# Patient Record
Sex: FEMALE | Race: OTHER | HISPANIC OR LATINO
[De-identification: names, ages, dates, MRNs, and addresses within clinical notes are randomized per-mention and may not be internally consistent; named-entity substitution may affect disease eponyms.]

---

## 2018-03-06 PROBLEM — Z00.00 ENCOUNTER FOR PREVENTIVE HEALTH EXAMINATION: Status: ACTIVE | Noted: 2018-03-06

## 2018-03-08 ENCOUNTER — APPOINTMENT (OUTPATIENT)
Dept: NEUROSURGERY | Facility: CLINIC | Age: 48
End: 2018-03-08
Payer: MEDICAID

## 2018-03-08 VITALS
SYSTOLIC BLOOD PRESSURE: 121 MMHG | DIASTOLIC BLOOD PRESSURE: 75 MMHG | BODY MASS INDEX: 29.88 KG/M2 | HEART RATE: 65 BPM | WEIGHT: 175 LBS | OXYGEN SATURATION: 99 % | HEIGHT: 64 IN

## 2018-03-08 DIAGNOSIS — Z87.09 PERSONAL HISTORY OF OTHER DISEASES OF THE RESPIRATORY SYSTEM: ICD-10-CM

## 2018-03-08 DIAGNOSIS — G43.909 MIGRAINE, UNSPECIFIED, NOT INTRACTABLE, W/OUT STATUS MIGRAINOSUS: ICD-10-CM

## 2018-03-08 DIAGNOSIS — B20 HUMAN IMMUNODEFICIENCY VIRUS [HIV] DISEASE: ICD-10-CM

## 2018-03-08 DIAGNOSIS — Z86.59 PERSONAL HISTORY OF OTHER MENTAL AND BEHAVIORAL DISORDERS: ICD-10-CM

## 2018-03-08 DIAGNOSIS — F20.9 SCHIZOPHRENIA, UNSPECIFIED: ICD-10-CM

## 2018-03-08 DIAGNOSIS — Z86.73 PERSONAL HISTORY OF TRANSIENT ISCHEMIC ATTACK (TIA), AND CEREBRAL INFARCTION W/OUT RESIDUAL DEFICITS: ICD-10-CM

## 2018-03-08 DIAGNOSIS — Q28.2 ARTERIOVENOUS MALFORMATION OF CEREBRAL VESSELS: ICD-10-CM

## 2018-03-08 DIAGNOSIS — Z56.0 UNEMPLOYMENT, UNSPECIFIED: ICD-10-CM

## 2018-03-08 DIAGNOSIS — Z78.9 OTHER SPECIFIED HEALTH STATUS: ICD-10-CM

## 2018-03-08 DIAGNOSIS — F31.9 BIPOLAR DISORDER, UNSPECIFIED: ICD-10-CM

## 2018-03-08 DIAGNOSIS — F19.90 OTHER PSYCHOACTIVE SUBSTANCE USE, UNSPECIFIED, UNCOMPLICATED: ICD-10-CM

## 2018-03-08 DIAGNOSIS — Z87.891 PERSONAL HISTORY OF NICOTINE DEPENDENCE: ICD-10-CM

## 2018-03-08 PROCEDURE — 99205 OFFICE O/P NEW HI 60 MIN: CPT

## 2018-03-08 SDOH — ECONOMIC STABILITY - INCOME SECURITY: UNEMPLOYMENT, UNSPECIFIED: Z56.0

## 2018-03-09 PROBLEM — Z87.09 HISTORY OF ASTHMA: Status: RESOLVED | Noted: 2018-03-08 | Resolved: 2018-03-09

## 2018-03-16 PROBLEM — Z86.59 HISTORY OF DEPRESSION: Status: RESOLVED | Noted: 2018-03-08 | Resolved: 2018-03-16

## 2018-03-16 RX ORDER — LURASIDONE HYDROCHLORIDE 40 MG/1
40 TABLET, FILM COATED ORAL
Refills: 0 | Status: ACTIVE | COMMUNITY

## 2018-03-16 RX ORDER — ASPIRIN ENTERIC COATED TABLETS 81 MG 81 MG/1
81 TABLET, DELAYED RELEASE ORAL DAILY
Refills: 0 | Status: ACTIVE | COMMUNITY

## 2018-03-30 ENCOUNTER — OUTPATIENT (OUTPATIENT)
Dept: OUTPATIENT SERVICES | Facility: HOSPITAL | Age: 48
LOS: 1 days | Discharge: NURSING, REHAB OR LONG-TERM CARE | End: 2018-03-30
Payer: MEDICAID

## 2018-03-30 LAB — HCG SERPL-ACNC: <.1 MIU/ML — SIGNIFICANT CHANGE UP

## 2018-03-30 PROCEDURE — 84702 CHORIONIC GONADOTROPIN TEST: CPT

## 2018-03-30 PROCEDURE — C1887: CPT

## 2018-03-30 PROCEDURE — 36226 PLACE CATH VERTEBRAL ART: CPT | Mod: LT

## 2018-03-30 PROCEDURE — C1769: CPT

## 2018-03-30 PROCEDURE — 36224 PLACE CATH CAROTD ART: CPT | Mod: 50

## 2018-03-30 PROCEDURE — C1894: CPT

## 2018-03-30 PROCEDURE — 36227 PLACE CATH XTRNL CAROTID: CPT | Mod: LT

## 2018-03-30 PROCEDURE — 36223 PLACE CATH CAROTID/INOM ART: CPT | Mod: 59,RT

## 2018-03-30 PROCEDURE — 36415 COLL VENOUS BLD VENIPUNCTURE: CPT

## 2018-03-30 PROCEDURE — 36224 PLACE CATH CAROTD ART: CPT | Mod: LT

## 2018-03-30 RX ORDER — SODIUM CHLORIDE 9 MG/ML
1000 INJECTION INTRAMUSCULAR; INTRAVENOUS; SUBCUTANEOUS
Qty: 0 | Refills: 0 | Status: DISCONTINUED | OUTPATIENT
Start: 2018-03-30 | End: 2018-03-30

## 2018-03-30 RX ORDER — ACETAMINOPHEN 500 MG
1000 TABLET ORAL ONCE
Qty: 0 | Refills: 0 | Status: DISCONTINUED | OUTPATIENT
Start: 2018-03-30 | End: 2018-03-30

## 2018-03-30 RX ORDER — CHLORHEXIDINE GLUCONATE 213 G/1000ML
1 SOLUTION TOPICAL ONCE
Qty: 0 | Refills: 0 | Status: DISCONTINUED | OUTPATIENT
Start: 2018-03-30 | End: 2018-03-30

## 2018-03-30 RX ADMIN — SODIUM CHLORIDE 100 MILLILITER(S): 9 INJECTION INTRAMUSCULAR; INTRAVENOUS; SUBCUTANEOUS at 13:32

## 2018-03-30 NOTE — BRIEF OPERATIVE NOTE - OPERATION/FINDINGS
Diagnostic cerebral angiogram performed via right CFA using a 4 Fr sheath with manual compression at the end of the case without any evidence of further bleeding or hematoma. Patient remained neurologically intact post procedure with some nausea and wretching. Findings consistent with a left frontal AVM. Full report to follow.

## 2018-03-30 NOTE — BRIEF OPERATIVE NOTE - PROCEDURE
<<-----Click on this checkbox to enter Procedure Cerebral angiography  03/30/2018    [facility]

## 2018-04-17 ENCOUNTER — APPOINTMENT (OUTPATIENT)
Dept: NEUROLOGY | Facility: CLINIC | Age: 48
End: 2018-04-17
Payer: MEDICAID

## 2018-04-17 PROCEDURE — 95819 EEG AWAKE AND ASLEEP: CPT

## 2018-04-18 PROCEDURE — 95953: CPT

## 2018-04-19 PROCEDURE — 95953: CPT

## 2018-04-20 ENCOUNTER — APPOINTMENT (OUTPATIENT)
Dept: NEUROLOGY | Facility: CLINIC | Age: 48
End: 2018-04-20

## 2018-04-20 PROCEDURE — 95953: CPT

## 2018-05-10 ENCOUNTER — CHART COPY (OUTPATIENT)
Age: 48
End: 2018-05-10

## 2018-07-22 PROBLEM — Z78.9 ALCOHOL USE: Status: ACTIVE | Noted: 2018-03-08

## 2018-07-22 PROBLEM — Z86.73 HISTORY OF STROKE: Status: RESOLVED | Noted: 2018-03-08 | Resolved: 2018-07-22

## 2018-07-27 ENCOUNTER — APPOINTMENT (OUTPATIENT)
Dept: NEUROLOGY | Facility: CLINIC | Age: 48
End: 2018-07-27
Payer: MEDICAID

## 2018-07-27 VITALS
BODY MASS INDEX: 28.17 KG/M2 | HEART RATE: 64 BPM | TEMPERATURE: 97.9 F | HEIGHT: 64 IN | SYSTOLIC BLOOD PRESSURE: 136 MMHG | WEIGHT: 165 LBS | OXYGEN SATURATION: 97 % | DIASTOLIC BLOOD PRESSURE: 91 MMHG

## 2018-07-27 DIAGNOSIS — G43.709 CHRONIC MIGRAINE W/OUT AURA, NOT INTRACTABLE, W/OUT STATUS MIGRAINOSUS: ICD-10-CM

## 2018-07-27 PROCEDURE — 99205 OFFICE O/P NEW HI 60 MIN: CPT

## 2018-07-27 RX ORDER — ELVITEGRAVIR, COBICISTAT, EMTRICITABINE, AND TENOFOVIR ALAFENAMIDE 150; 150; 200; 10 MG/1; MG/1; MG/1; MG/1
150-150-200-10 TABLET ORAL DAILY
Qty: 30 | Refills: 1 | Status: ACTIVE | COMMUNITY
Start: 2018-07-27

## 2018-09-19 ENCOUNTER — OTHER (OUTPATIENT)
Age: 48
End: 2018-09-19

## 2018-10-05 ENCOUNTER — APPOINTMENT (OUTPATIENT)
Dept: NEUROLOGY | Facility: CLINIC | Age: 48
End: 2018-10-05

## 2018-10-17 ENCOUNTER — APPOINTMENT (OUTPATIENT)
Dept: NEUROLOGY | Facility: CLINIC | Age: 48
End: 2018-10-17
Payer: MEDICAID

## 2018-10-17 VITALS
SYSTOLIC BLOOD PRESSURE: 125 MMHG | HEART RATE: 74 BPM | DIASTOLIC BLOOD PRESSURE: 79 MMHG | BODY MASS INDEX: 28.17 KG/M2 | WEIGHT: 165 LBS | OXYGEN SATURATION: 98 % | HEIGHT: 64 IN

## 2018-10-17 DIAGNOSIS — R25.1 TREMOR, UNSPECIFIED: ICD-10-CM

## 2018-10-17 PROCEDURE — 95911 NRV CNDJ TEST 9-10 STUDIES: CPT

## 2018-10-17 PROCEDURE — 95886 MUSC TEST DONE W/N TEST COMP: CPT | Mod: 59

## 2018-10-17 RX ORDER — LANSOPRAZOLE 30 MG/1
30 CAPSULE, DELAYED RELEASE ORAL DAILY
Refills: 0 | Status: DISCONTINUED | COMMUNITY
End: 2018-10-17

## 2018-10-22 PROBLEM — R25.1 OCCASIONAL TREMORS: Status: ACTIVE | Noted: 2018-04-06

## 2019-04-02 ENCOUNTER — APPOINTMENT (OUTPATIENT)
Dept: NEUROLOGY | Facility: CLINIC | Age: 49
End: 2019-04-02
Payer: MEDICAID

## 2019-04-02 VITALS
DIASTOLIC BLOOD PRESSURE: 86 MMHG | HEART RATE: 70 BPM | WEIGHT: 180 LBS | TEMPERATURE: 98.4 F | BODY MASS INDEX: 33.13 KG/M2 | HEIGHT: 62 IN | SYSTOLIC BLOOD PRESSURE: 153 MMHG | OXYGEN SATURATION: 99 %

## 2019-04-02 DIAGNOSIS — R06.89 OTHER ABNORMALITIES OF BREATHING: ICD-10-CM

## 2019-04-02 DIAGNOSIS — G83.20 MONOPLEGIA OF UPPER LIMB AFFECTING UNSPECIFIED SIDE: ICD-10-CM

## 2019-04-02 DIAGNOSIS — M54.12 RADICULOPATHY, CERVICAL REGION: ICD-10-CM

## 2019-04-02 PROCEDURE — 99215 OFFICE O/P EST HI 40 MIN: CPT

## 2019-04-02 RX ORDER — GABAPENTIN 300 MG/1
300 CAPSULE ORAL 3 TIMES DAILY
Refills: 0 | Status: DISCONTINUED | COMMUNITY
End: 2019-04-02

## 2019-04-02 RX ORDER — LITHIUM CARBONATE 300 MG/1
300 TABLET, FILM COATED, EXTENDED RELEASE ORAL
Refills: 0 | Status: DISCONTINUED | COMMUNITY
End: 2019-04-02

## 2019-04-02 RX ORDER — NAPROXEN 500 MG/1
500 TABLET ORAL
Refills: 0 | Status: DISCONTINUED | COMMUNITY

## 2019-04-02 RX ORDER — PRAZOSIN HYDROCHLORIDE 1 MG/1
1 CAPSULE ORAL
Refills: 0 | Status: DISCONTINUED | COMMUNITY
End: 2019-04-02

## 2019-04-08 NOTE — REVIEW OF SYSTEMS
[Negative] : Heme/Lymph [Shortness Of Breath] : shortness of breath [Abdominal Pain] : abdominal pain [Vomiting] : vomiting [As Noted in HPI] : as noted in HPI

## 2019-04-15 NOTE — HISTORY OF PRESENT ILLNESS
[FreeTextEntry1] : Egyptian speaking former smoker with a history of stroke in 2006 in [place] with left hand residual, migraines, HIV+ (diagnosed in 2014), mouth chlamydia, schizophrenia, bipolar disorder, and depression who initially presented to the office for referral after "they found something in my brain". She was diagnosed with AVM of the brain after an MRI was obtained as part of work up for left hand tremors. She denied history of seizures and headaches not associated with her migraine at that time. \par \par MRI brain from 2/27/18: evidence of AVM nidus in the LEFT posterior frontal lobe with dilated venous flow voids.\par \par s/p cerebral angiogram on 3/30/18 with the  diagnosis of a 4.5 cm LEFT posterior frontal AVM with cortical and deep venous drainage.\par \par Case was presented on Cerebrovascular Conference with the plan for a 72 ambulatory EEG (referred to ) to rule out left hand tremors as seizures vs neuroleptics induced, or of schizophrenia is due to seizure activities.  Possible treatment plans post 72 hr EEG include radiosurgery vs embolization vs conservative treatment.\par \par 72hr EEG from 4/17/18: No epileptiform discharges or findings consistent with epileptiform activity. \par \par EMG 10/17/2018: 1) Reduced left sided median and ulnar amplitudes along with chronic neurogenic changes at the left APB and FDI. These findings are most consistent with a C8-T1 territory, chronic radiculopathy. A process affecting the lower brachial plexus can also be considered, although less likely given sensory sparing, and chronic changes seen only in the distal muscles of the hand. 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 Amplitude drop at the elbow may also reflect focal compression or be technical in nature.\par 3). Incomplete activation at multiple muscles of the left arm is nonspecific, can be seen in the setting of limited effort or secondary to a central process. \par \par She still complains of tremors and numbness from left shoulder to the tip of her fingers.\par \par \par Handedness: RIGHT \par \par Patient Address:\par [address] [address]\par \par Referring/Neurologist\par Dr. Chris Bethea\par [address] [facility], [address]\[place] [address]\par [phone]\par \par PCP/ Infectious Disease:\par Dr. Laquita Carbajal\par [address]\[place] [address] \par \par Neurologist:\par Dr.Salman Tobar\par [address]\[place] [address]

## 2019-04-16 ENCOUNTER — APPOINTMENT (OUTPATIENT)
Dept: NEUROSURGERY | Facility: CLINIC | Age: 49
End: 2019-04-16

## 2019-04-24 ENCOUNTER — APPOINTMENT (OUTPATIENT)
Dept: MRI IMAGING | Facility: HOSPITAL | Age: 49
End: 2019-04-24

## 2019-09-16 NOTE — DATA REVIEWED
[de-identified] : EMG 10/17/2018: 1) Reduced left sided median and ulnar amplitudes along with chronic neurogenic changes at the left APB and FDI. These findings are most consistent with a C8-T1 territory, chronic radiculopathy. A process affecting the lower brachial plexus can also be considered, although less likely given sensory sparing, and chronic changes seen only in the distal muscles of the hand. 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 Amplitude drop at the elbow may also reflect focal compression or be technical in nature.\par 3). Incomplete activation at multiple muscles of the left arm is nonspecific, can be seen in the setting of limited effort or secondary to a central process.

## 2019-09-16 NOTE — PHYSICAL EXAM
[FreeTextEntry1] : Constitutional: alert, in no acute distress, well nourished and well developed.\par Psychiatric:  insight and judgment were intact.\par \par Neurologic: \par Mental Status: The patient is alert, attentive, and oriented to person, place, and time. Speech is clear and fluent with good repetition, comprehension, and naming. \par Cranial nerves:\par CN II: Visual fields are full to confrontation. Fundoscopic exam is normal with sharp discs and no vascular changes.Visual acuity is intact. \par CN III, IV, VI: EOMI, PERRLA\par CN V: Facial sensation is intact to pinprick in all 3 divisions bilaterally. Corneal responses are intact.\par CN VII: Face is symmetric with normal eye closure and smile.\par CN VIII: Hearing is normal to rubbing fingers\par CN IX, X: Palate elevates symmetrically. Phonation is normal.\par CN XI: Head turning and shoulder shrug are intact and symmetric.\par CN XII: Tongue is midline with normal movements and no atrophy and no deviation with protrusion.\par Motor: There is no pronator drift of out-stretched arms. Muscle bulk and tone is normal. Strength is 4/5 in LUE and 5/5 in RUE; 5/5 on both LE. ++tremor of left hand. \par Sensation: light touch is intact; vibration b/l intact.\par Coordination: Dystaxia of LUE on FTN w/out dysmetria. FTN normal with RUE. Negative Romberg. \par Gait/Stance: Posture is normal. Gait is steady with normal steps, base, arm swing, and turning.\par \par Eyes: sclera and conjunctiva were normal.\par \par

## 2019-09-16 NOTE — HISTORY OF PRESENT ILLNESS
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 \par \par Patient reports the symptoms of her left upper extremity is still present: tremors and numbness from left shoulder to the tip of her fingers. She states it is the same as the last visit. Some days it is worse especially when stressed; it varies. \par \par Patient also complains of gas pain in the stomach that has been going on for over a month. She stopped her medications due to vomiting the past 3 days. Patient reported to her PCP Dr. Glenn Carranza on her heart racing and stomach pain.  Patient reports only being on aspirin 81 mg, latuda 40mg, genvoya, and newly RX'd Zolpidem at night by her psychiatrist. \par \par : Gilda #[id number]\par

## 2019-09-16 NOTE — ASSESSMENT
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 Patient still complaining of shortness of breath and abdominal pain. Tremor may not be from seizures.\par \par Plan:\par -MRI of cervical  and spine w/o contrast since last one was done in 2012\par -MRI of the brain w/out contrast\par -Pulmonology referral for SOB/breathing problem\par -Refer back to Dr. Lui to address AVM